# Patient Record
Sex: FEMALE | Race: BLACK OR AFRICAN AMERICAN | Employment: FULL TIME | URBAN - METROPOLITAN AREA
[De-identification: names, ages, dates, MRNs, and addresses within clinical notes are randomized per-mention and may not be internally consistent; named-entity substitution may affect disease eponyms.]

---

## 2021-06-07 ENCOUNTER — HOSPITAL ENCOUNTER (OUTPATIENT)
Dept: PHYSICAL THERAPY | Age: 56
Discharge: HOME | End: 2021-06-07
Payer: COMMERCIAL

## 2021-06-07 PROCEDURE — 97162 PT EVAL MOD COMPLEX 30 MIN: CPT

## 2021-06-07 PROCEDURE — 97535 SELF CARE MNGMENT TRAINING: CPT

## 2021-06-07 NOTE — PROGRESS NOTES
PHYSICAL THERAPY - DAILY TREATMENT NOTE    Patient Name: Juan Do        Date: 2021  : 1965   yes Patient  Verified  Visit #:     Insurance: Payor: Lynn Valdez / Plan: [address] [place] [address] [place] HMO / Product Type: HMO /      In time: 603 pm Out time: 640 pm   Total Treatment Time: 37     Medicare/BCBS Time Tracking (below)   Total Timed Codes (min):  na 1:1 Treatment Time:  na     TREATMENT AREA =  Left knee pain [M25.562]    SUBJECTIVE  Pain Level (on 0 to 10 scale):  2 / 10   Medication Changes/New allergies or changes in medical history, any new surgeries or procedures?    no  If yes, update Summary List   Subjective Functional Status/Changes:  []  No changes reported     See POC       OBJECTIVE  Modalities Rationale: decrease edema, decrease inflammation and decrease pain to improve patient's ability to perform LE functional tasks and ADLs   min [] Estim, type/location:                                     []  att     []  unatt     []  w/US     []  w/ice    []  w/heat    min []  Mechanical Traction: type/lbs                   []  pro   []  sup   []  int   []  cont    []  before manual    []  after manual    min []  Ultrasound, settings/location:      min []  Iontophoresis w/ dexamethasone, location:                                               []  take home patch       []  in clinic   10 min [x]  Ice     []  Heat    location/position: To L knee s/p session in supine H/L    min []  Vasopneumatic Device, press/temp:     min []  Other:    [x] Skin assessment post-treatment (if applicable):    [x]  intact    []  redness- no adverse reaction     []redness  adverse reaction:        15 min Self Care: See pt education   Rationale:    Pt education to improve the patient's ability to adhere to PT POC    Billed With/As:  [] TE  [] TA  [] Neuro  [x] Self Care Patient Education: [x] Review HEP    [] Progressed/Changed HEP based on:   [] positioning   [] body mechanics   [] transfers   [] heat/ice application    [x] other: PT diagnosis, prognosis, POC     Other Objective/Functional Measures:    See POC     Post Treatment Pain Level (on 0 to 10) scale:   1  / 10     ASSESSMENT  Assessment/Changes in Function:     Justification for Eval Code Complexity:  Patient History: BMI >30, cancer, thyroid dysfunction, cancer hx, chronicity of symptoms  Examination: See exam  Clinical Presentation: evolving  Clinical Decision Making: MEDIUM  FOTO: 44 /100     []  See Progress Note/Recertification   Patient will continue to benefit from skilled PT services to modify and progress therapeutic interventions, address functional mobility deficits, address ROM deficits, address strength deficits, analyze and address soft tissue restrictions, analyze and cue movement patterns, analyze and modify body mechanics/ergonomics, assess and modify postural abnormalities and address imbalance/dizziness to attain remaining goals. Progress toward goals / Updated goals:    See POC     PLAN  [x]  Upgrade activities as tolerated yes Continue plan of care   []  Discharge due to :    []  Other:      Therapist: Madiha Ogden PT    Date: 6/7/2021 Time: 6:00 PM     No future appointments. None
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
## 2021-06-17 ENCOUNTER — HOSPITAL ENCOUNTER (OUTPATIENT)
Dept: PHYSICAL THERAPY | Age: 56
Discharge: HOME | End: 2021-06-17
Payer: COMMERCIAL

## 2021-06-17 PROCEDURE — 97530 THERAPEUTIC ACTIVITIES: CPT

## 2021-06-17 PROCEDURE — 97110 THERAPEUTIC EXERCISES: CPT

## 2021-06-17 NOTE — PROGRESS NOTES
PHYSICAL THERAPY - DAILY TREATMENT NOTE    Patient Name: Kiana Blanco        Date: 2021  : 1965   yes Patient  Verified  Visit #:      of   16  Insurance: Payor: Rodger Singh / Plan: [employer]le[employer] HMO / Product Type: HMO /      In time: 605 pm Out time: 643 pm   Total Treatment Time: 38     Medicare/BCBS Time Tracking (below)   Total Timed Codes (min):  na 1:1 Treatment Time:  na     TREATMENT AREA =  Left knee pain [M25.562]    SUBJECTIVE  Pain Level (on 0 to 10 scale):  1 / 10   Medication Changes/New allergies or changes in medical history, any new surgeries or procedures?    no  If yes, update Summary List   Subjective Functional Status/Changes:  []  No changes reported     Pt reports no significant changes since last session. OBJECTIVE  30 min Therapeutic Exercise:  [x]  See flow sheet   Rationale:      increase ROM and increase strength to improve the patient's ability to perform LE functional tasks and ADLs     8 min Therapeutic Activity: [x]  See flow sheet   Rationale:    increase strength, improve coordination and increase proprioception to improve the patient's ability to perform functional tasks and ADLs    Billed With/As:  [x] TE  [] TA  [] Neuro  [] Self Care Patient Education: [x] Review HEP    [] Progressed/Changed HEP based on:   [x] positioning   [x] body mechanics   [] transfers   [] heat/ice application    [] other:     Other Objective/Functional Measures:    L knee flex AAROM: 135     Post Treatment Pain Level (on 0 to 10) scale:   0  / 10     ASSESSMENT  Assessment/Changes in Function:     No adverse effects noted with treatment this date. Pt knee flex improved to baseline. Pt deferred CP when offered at end of session. Progress as kojo.      []  See Progress Note/Recertification   Patient will continue to benefit from skilled PT services to modify and progress therapeutic interventions, address functional mobility deficits, address ROM deficits, address strength deficits, analyze and address soft tissue restrictions, analyze and cue movement patterns, analyze and modify body mechanics/ergonomics, assess and modify postural abnormalities, address imbalance/dizziness and instruct in home and community integration to attain remaining goals.    Progress toward goals / Updated goals:    First f/u since POC     PLAN  [x]  Upgrade activities as tolerated yes Continue plan of care   []  Discharge due to :    []  Other:      Therapist: Uche Becker PT    Date: 6/17/2021 Time: 6:00 PM     Future Appointments   Date Time Provider Davi Alvarenga   6/30/2021  4:30 PM Brennan Pichardo, PT [facility]   7/1/2021  4:30 PM Brennan Pichardo, PT [facility]   7/7/2021  4:30 PM Елена Cassidy [facility]   7/8/2021  4:30 PM Brennan Pichardo, PT [facility]   7/12/2021  4:30 PM Елена Cassidy [facility]   7/14/2021  4:30 PM Pipe Kim [facility]   7/19/2021  [facility]   7/21/2021  4:30 PM Pipe Kim [facility]   7/26/2021  4:30 PM Елена Cassidy [facility]   7/28/2021  4:30 PM Pipe Kim [facility]

## 2021-06-30 ENCOUNTER — HOSPITAL ENCOUNTER (OUTPATIENT)
Dept: PHYSICAL THERAPY | Age: 56
Discharge: HOME | End: 2021-06-30
Payer: COMMERCIAL

## 2021-06-30 PROCEDURE — 97535 SELF CARE MNGMENT TRAINING: CPT

## 2021-06-30 PROCEDURE — 97110 THERAPEUTIC EXERCISES: CPT

## 2021-06-30 PROCEDURE — 97530 THERAPEUTIC ACTIVITIES: CPT

## 2021-06-30 NOTE — PROGRESS NOTES
PHYSICAL THERAPY - DAILY TREATMENT NOTE    Patient Name: Ron Westfall        Date: 2021  : 1965   yes Patient  Verified  Visit #:   3   of   16  Insurance: Payor: Haleigh Kennedy / Plan: Farhad Chawla HMO / Product Type: HMO /      In time: 435 pm Out time: 507 pm   Total Treatment Time: 32     Medicare/BCBS Time Tracking (below)   Total Timed Codes (min):  na 1:1 Treatment Time:  na     TREATMENT AREA =  Left knee pain [M25.562]    SUBJECTIVE  Pain Level (on 0 to 10 scale):  1 / 10   Medication Changes/New allergies or changes in medical history, any new surgeries or procedures?    no  If yes, update Summary List   Subjective Functional Status/Changes:  []  No changes reported     Pt reported an ache/burning overnight on Monday after icing before going to bed. It resolved through Tuesday, but then got worst today at work, getting up to a 10/10 before finally easing off in the afternoon. OBJECTIVE  16 min Therapeutic Exercise:  [x]  See flow sheet   Rationale:      increase ROM and increase strength to improve the patient's ability to perform functional tasks and ADLs     8 min Therapeutic Activity: [x]  See flow sheet   Rationale:    increase strength, improve coordination and increase proprioception to improve the patient's ability to perform functional tasks and ADLs    8 min Self Care: See pt education   Rationale:    Pt education to improve the patient's ability to perform exercises, adhere to PT POC    Billed With/As:  [] TE  [] TA  [] Neuro  [x] Self Care Patient Education: [x] Review HEP    [] Progressed/Changed HEP based on:   [x] positioning   [x] body mechanics   [] transfers   [] heat/ice application    [] other:     Other Objective/Functional Measures:    Performed exercises per flowsheet     Post Treatment Pain Level (on 0 to 10) scale:   0  / 10     ASSESSMENT  Assessment/Changes in Function:     No adverse effects noted with today's session.  Assessed blood flow and skin integrity bilat calf, unremarkable. Issued HEP to assist with appt carry over. []  See Progress Note/Recertification   Patient will continue to benefit from skilled PT services to modify and progress therapeutic interventions, address functional mobility deficits, address ROM deficits, address strength deficits, analyze and address soft tissue restrictions, analyze and cue movement patterns, analyze and modify body mechanics/ergonomics and assess and modify postural abnormalities to attain remaining goals. Progress toward goals / Updated goals:    · To be accomplished in 4 weeks per POC 6/7/21:  1) Patient performing daily home exercise program to facilitate appt carryover and POC. 2) Patient to demo L knee flex AROM 130 in order to facilitate LE functional tasks, ADLs, return to recreation. 3)Patient to demo FOTO score of 54 indicating improved function and quality of life. 4) Pt to report >=+3 on GROC indicating clinically significant subjective functional improvement.        PLAN  [x]  Upgrade activities as tolerated yes Continue plan of care   []  Discharge due to :    []  Other:      Therapist: Arian Betts PT    Date: 6/30/2021 Time: 4:30 PM     Future Appointments   Date Time Provider Davi Alvarenga   7/1/2021  4:30 PM Aye Barron,  [address] [facility]   7/7/2021  4:30 PM Kirstin Garvin [facility]   7/8/2021  4:30 PM Aye Barron,  [address] [facility]   7/12/2021  4:30 PM Lanny Parent [address] [facility]   7/14/2021  4:30 PM Lanny Parent [address] [facility]   7/19/2021  4:30 PM Lanny Parent [address] [facility]   7/21/2021  4:30 PM Lanny Parent [address] [facility]   7/26/2021  4:30 PM Kirstin Bharathi [facility]   7/28/2021  4:30 PM Lanny Parent [facility]

## 2021-07-01 ENCOUNTER — APPOINTMENT (OUTPATIENT)
Dept: PHYSICAL THERAPY | Age: 56
End: 2021-07-01
Payer: COMMERCIAL

## 2021-07-07 ENCOUNTER — HOSPITAL ENCOUNTER (OUTPATIENT)
Dept: PHYSICAL THERAPY | Age: 56
Discharge: HOME | End: 2021-07-07
Payer: COMMERCIAL

## 2021-07-07 PROCEDURE — 97110 THERAPEUTIC EXERCISES: CPT

## 2021-07-07 PROCEDURE — 97140 MANUAL THERAPY 1/> REGIONS: CPT

## 2021-07-07 NOTE — PROGRESS NOTES
PHYSICAL THERAPY - DAILY TREATMENT NOTE    Patient Name: Melita Haas        Date: 2021  : 1965   yes Patient  Verified  Visit #:      16  Insurance: Payor: Shauna Musa / Plan: [address] [place] [address] [place] HMO / Product Type: HMO /      In time: 4:35 Out time: 5:15   Total Treatment Time: 40     Medicare/BCBS Time Tracking (below)   Total Timed Codes (min):  na 1:1 Treatment Time:  na     TREATMENT AREA =  Left knee pain [M25.562]    SUBJECTIVE  Pain Level (on 0 to 10 scale): 0 / 10   Medication Changes/New allergies or changes in medical history, any new surgeries or procedures?    no  If yes, update Summary List   Subjective Functional Status/Changes:  []  No changes reported     Patient reports having pain across the top of the L knee towards the lateral joint line into the infrapatellar area when she was laying in bed. OBJECTIVE  25 min Therapeutic Exercise:  [x]  See flow sheet   Rationale:      increase ROM and increase strength to improve the patient's ability to perform functional tasks and ADLs     15 min Manual Therapy: CFM to L patellar tendon and quadriceps tendon; STM to distal quadriceps    Rationale:      decrease pain, increase ROM and increase tissue extensibility to improve patient's ability to tolerate walking and standing activities. The manual therapy interventions were performed at a separate and distinct time from the therapeutic activities interventions. Billed With/As:  [x] TE  [] TA  [] Neuro  [] Self Care Patient Education: [x] Review HEP    [] Progressed/Changed HEP based on:   [x] positioning   [x] body mechanics   [] transfers   [] heat/ice application    [] other:     Other Objective/Functional Measures:    *L knee extension limited 10% compared to R knee. Tenderness noted along patellar tendon during quadriceps activation.   *added multiple exercises to improve LE strength and stability (see flowsheet)      Post Treatment Pain Level (on 0 to 10) scale:   0 / 10 ASSESSMENT  Assessment/Changes in Function:     Patient noted no pain following PT session. Encouraged pt to ice at home to reduce c/o tightness and tenderness in the L knee. []  See Progress Note/Recertification   Patient will continue to benefit from skilled PT services to modify and progress therapeutic interventions, address functional mobility deficits, address ROM deficits, address strength deficits, analyze and address soft tissue restrictions, analyze and cue movement patterns, analyze and modify body mechanics/ergonomics and assess and modify postural abnormalities to attain remaining goals. Progress toward goals / Updated goals:    · To be accomplished in 4 weeks per POC 6/7/21:  1) Patient performing daily home exercise program to facilitate appt carryover and POC. 2) Patient to demo L knee flex AROM 130 in order to facilitate LE functional tasks, ADLs, return to recreation. Met 07/07; 130 deg flex  3)Patient to demo FOTO score of 54 indicating improved function and quality of life. 4) Pt to report >=+3 on GROC indicating clinically significant subjective functional improvement.        PLAN  [x]  Upgrade activities as tolerated yes Continue plan of care   []  Discharge due to :    []  Other:      Therapist: Luis F Bhatti    Date: 7/7/2021 Time: 7:03 PM     Future Appointments   Date Time Provider Davi Alvarenga   7/7/2021  4:30 PM [facility]   7/8/2021  4:30 PM Aba Velázquez, PT [facility]   7/12/2021  4:30 PM [facility]   7/14/2021  4:30 PM [facility]   7/19/2021  4:30 PM [facility]   7/21/2021  4:30 PM [facility]   7/26/2021  4:30 PM [facility]   7/28/2021  4:30 PM [facility]

## 2021-07-08 ENCOUNTER — HOSPITAL ENCOUNTER (OUTPATIENT)
Dept: PHYSICAL THERAPY | Age: 56
Discharge: HOME | End: 2021-07-08
Payer: COMMERCIAL

## 2021-07-08 PROCEDURE — 97530 THERAPEUTIC ACTIVITIES: CPT

## 2021-07-08 PROCEDURE — 97110 THERAPEUTIC EXERCISES: CPT

## 2021-07-08 PROCEDURE — 97535 SELF CARE MNGMENT TRAINING: CPT

## 2021-07-08 NOTE — PROGRESS NOTES
[address] [facility]ICAL [facility]   [facility], [address] [place], [place], [address] [facility] - Phone: [phone]  Fax: [phone] SUMMARY  Patient Name: Gabbi Cunningham : 1965   Treatment/Medical Diagnosis: Left knee pain [M25.562]   Referral Source: To, Anabel Roldan MD     Date of Initial Visit: 21 Attended Visits: 5 Missed Visits: 1     SUMMARY OF TREATMENT  Skilled PT services have consisted of: ther ex, ther act, self care, pt education, HEP  CURRENT STATUS  Pain max over last couple of weeks: 3/10    Pt goal status: Pt has achieved or is progressing towards all established goals    Goal/Measure of Progress Goal Met? 1. Patient Independent with progressive HEP to facilitate symptom management and progression upon DC. Status at Last Eval: New goal Current Status: Adherent yes   2. Patient to demo FOTO score of 64 indicating improved function and quality of life. Status at Last Eval: 44 Current Status: 88 yes   3. Pt to report >=+5 on GROC indicating clinically significant subjective functional improvement. Status at Last Eval: New goal  Current Status: +6 yes   4. Patient to report being able to perform LE functional tasks with pain no >2/10 in order to have improved functional mobility and QOL. Status at Last Eval: 10/10 at worst Current Status: 3/10 at worst Progressing   5. Patient to demo L knee flex AROM 130 in order to facilitate LE functional tasks, ADLs, return to recreation. Status at Last Eval: 121 with pain Current Status: 135 no pain yes     RECOMMENDATIONS  Discontinue therapy. Progressing towards or have reached established goals. If you have any questions/comments please contact us directly at [phone]. Thank you for allowing us to assist in the care of your patient.     Therapist Signature: Tanna Franklin PT Date: 21     Time: 4:57 PM

## 2021-07-08 NOTE — PROGRESS NOTES
PHYSICAL THERAPY - DAILY TREATMENT NOTE    Patient Name: Rachel Memos        Date: 2021  : 1965   yes Patient  Verified  Visit #:   5   of   5  Insurance: Payor: Monica Al / Plan: [employer] / Product Type: HMO /      In time: 436 pm Out time: 514 pm   Total Treatment Time: 38     Medicare/BCBS Time Tracking (below)   Total Timed Codes (min):  na 1:1 Treatment Time:  na     TREATMENT AREA =  Left knee pain [M25.562]    SUBJECTIVE  Pain Level (on 0 to 10 scale):  1 / 10   Medication Changes/New allergies or changes in medical history, any new surgeries or procedures?    no  If yes, update Summary List   Subjective Functional Status/Changes:  []  No changes reported     See Discharge Summary       OBJECTIVE  15 min Therapeutic Exercise:  [x]  See flow sheet   Rationale:      increase ROM and increase strength to improve the patient's ability to perform functional tasks and ADLs     8 min Therapeutic Activity: [x]  See flow sheet   Rationale:    increase strength, improve balance and increase proprioception to improve the patient's ability to perform functional tasks and ADLs    15 min Self Care: See pt education   Rationale:    Pt education to improve the patient's ability to perform self-management Independently upon DC    Billed With/As:  [] TE  [] TA  [] Neuro  [x] Self Care Patient Education: [x] Review HEP    [] Progressed/Changed HEP based on:   [] positioning   [] body mechanics   [] transfers   [] heat/ice application    [x] Other: DC instructions     Other Objective/Functional Measures:    See Discharge Summary     Post Treatment Pain Level (on 0 to 10) scale:   0  / 10     ASSESSMENT  Assessment/Changes in Function:     See Discharge Summary     []  See Progress Note/Recertification   DC patient at this time.    Progress toward goals / Updated goals:    See Discharge Summary     PLAN  []  Upgrade activities as tolerated no Continue plan of care   [x]  Discharge due to : Pt has achieved or is progressing towards all established program goals   []  Other:      Therapist: Ritesh Shaikh PT    Date: 7/8/2021 Time: 4:30 PM     No future appointments.

## 2021-07-12 ENCOUNTER — APPOINTMENT (OUTPATIENT)
Dept: PHYSICAL THERAPY | Age: 56
End: 2021-07-12
Payer: COMMERCIAL

## 2021-07-14 ENCOUNTER — APPOINTMENT (OUTPATIENT)
Dept: PHYSICAL THERAPY | Age: 56
End: 2021-07-14
Payer: COMMERCIAL

## 2021-07-19 ENCOUNTER — APPOINTMENT (OUTPATIENT)
Dept: PHYSICAL THERAPY | Age: 56
End: 2021-07-19
Payer: COMMERCIAL

## 2021-07-21 ENCOUNTER — APPOINTMENT (OUTPATIENT)
Dept: PHYSICAL THERAPY | Age: 56
End: 2021-07-21
Payer: COMMERCIAL

## 2021-07-26 ENCOUNTER — APPOINTMENT (OUTPATIENT)
Dept: PHYSICAL THERAPY | Age: 56
End: 2021-07-26
Payer: COMMERCIAL

## 2021-07-28 ENCOUNTER — APPOINTMENT (OUTPATIENT)
Dept: PHYSICAL THERAPY | Age: 56
End: 2021-07-28
Payer: COMMERCIAL